# Patient Record
Sex: MALE | Race: BLACK OR AFRICAN AMERICAN | NOT HISPANIC OR LATINO | Employment: FULL TIME | URBAN - METROPOLITAN AREA
[De-identification: names, ages, dates, MRNs, and addresses within clinical notes are randomized per-mention and may not be internally consistent; named-entity substitution may affect disease eponyms.]

---

## 2022-09-14 ENCOUNTER — HOSPITAL ENCOUNTER (EMERGENCY)
Facility: OTHER | Age: 42
Discharge: HOME | End: 2022-09-14
Attending: EMERGENCY MEDICINE
Payer: MEDICAID

## 2022-09-14 VITALS
SYSTOLIC BLOOD PRESSURE: 159 MMHG | HEART RATE: 95 BPM | WEIGHT: 310 LBS | BODY MASS INDEX: 38.54 KG/M2 | DIASTOLIC BLOOD PRESSURE: 95 MMHG | TEMPERATURE: 98 F | RESPIRATION RATE: 25 BRPM | OXYGEN SATURATION: 97 % | HEIGHT: 75 IN

## 2022-09-14 DIAGNOSIS — R73.9 HIGH BLOOD SUGAR: ICD-10-CM

## 2022-09-14 DIAGNOSIS — R00.2 PALPITATIONS: Primary | ICD-10-CM

## 2022-09-14 DIAGNOSIS — G47.30 SLEEP APNEA, UNSPECIFIED TYPE: ICD-10-CM

## 2022-09-14 DIAGNOSIS — E87.6 HYPOKALEMIA: ICD-10-CM

## 2022-09-14 DIAGNOSIS — R00.2 PALPITATION: ICD-10-CM

## 2022-09-14 LAB
ANION GAP SERPL CALC-SCNC: 8 MMOL/L (ref 8–16)
BASOPHILS # BLD AUTO: 0.01 K/UL (ref 0–0.2)
BASOPHILS NFR BLD: 0.1 % (ref 0–1.9)
BNP SERPL-MCNC: <10 PG/ML (ref 0–99)
BUN SERPL-MCNC: 12 MG/DL (ref 6–20)
CALCIUM SERPL-MCNC: 8.7 MG/DL (ref 8.7–10.5)
CHLORIDE SERPL-SCNC: 103 MMOL/L (ref 95–110)
CO2 SERPL-SCNC: 27 MMOL/L (ref 23–29)
CREAT SERPL-MCNC: 1.3 MG/DL (ref 0.5–1.4)
DIFFERENTIAL METHOD: ABNORMAL
EOSINOPHIL # BLD AUTO: 0.1 K/UL (ref 0–0.5)
EOSINOPHIL NFR BLD: 1.2 % (ref 0–8)
ERYTHROCYTE [DISTWIDTH] IN BLOOD BY AUTOMATED COUNT: 14.1 % (ref 11.5–14.5)
EST. GFR  (NO RACE VARIABLE): >60 ML/MIN/1.73 M^2
GLUCOSE SERPL-MCNC: 246 MG/DL (ref 70–110)
HCT VFR BLD AUTO: 42.2 % (ref 40–54)
HGB BLD-MCNC: 13.3 G/DL (ref 14–18)
IMM GRANULOCYTES # BLD AUTO: 0.06 K/UL (ref 0–0.04)
IMM GRANULOCYTES NFR BLD AUTO: 0.6 % (ref 0–0.5)
LYMPHOCYTES # BLD AUTO: 1.3 K/UL (ref 1–4.8)
LYMPHOCYTES NFR BLD: 12.7 % (ref 18–48)
MCH RBC QN AUTO: 29.2 PG (ref 27–31)
MCHC RBC AUTO-ENTMCNC: 31.5 G/DL (ref 32–36)
MCV RBC AUTO: 93 FL (ref 82–98)
MONOCYTES # BLD AUTO: 0.4 K/UL (ref 0.3–1)
MONOCYTES NFR BLD: 3.6 % (ref 4–15)
NEUTROPHILS # BLD AUTO: 8.4 K/UL (ref 1.8–7.7)
NEUTROPHILS NFR BLD: 81.8 % (ref 38–73)
NRBC BLD-RTO: 0 /100 WBC
PLATELET # BLD AUTO: 282 K/UL (ref 150–450)
PMV BLD AUTO: 9.4 FL (ref 9.2–12.9)
POTASSIUM SERPL-SCNC: 3.3 MMOL/L (ref 3.5–5.1)
RBC # BLD AUTO: 4.55 M/UL (ref 4.6–6.2)
SODIUM SERPL-SCNC: 138 MMOL/L (ref 136–145)
T4 FREE SERPL-MCNC: 0.89 NG/DL (ref 0.71–1.51)
TROPONIN I SERPL DL<=0.01 NG/ML-MCNC: 0.01 NG/ML (ref 0–0.03)
TSH SERPL DL<=0.005 MIU/L-ACNC: 4.82 UIU/ML (ref 0.4–4)
WBC # BLD AUTO: 10.29 K/UL (ref 3.9–12.7)

## 2022-09-14 PROCEDURE — 36415 COLL VENOUS BLD VENIPUNCTURE: CPT | Performed by: EMERGENCY MEDICINE

## 2022-09-14 PROCEDURE — 84439 ASSAY OF FREE THYROXINE: CPT | Performed by: EMERGENCY MEDICINE

## 2022-09-14 PROCEDURE — 84443 ASSAY THYROID STIM HORMONE: CPT | Performed by: EMERGENCY MEDICINE

## 2022-09-14 PROCEDURE — 85025 COMPLETE CBC W/AUTO DIFF WBC: CPT | Performed by: EMERGENCY MEDICINE

## 2022-09-14 PROCEDURE — 93010 EKG 12-LEAD: ICD-10-PCS | Mod: ,,, | Performed by: INTERNAL MEDICINE

## 2022-09-14 PROCEDURE — 99285 EMERGENCY DEPT VISIT HI MDM: CPT | Mod: 25

## 2022-09-14 PROCEDURE — 80048 BASIC METABOLIC PNL TOTAL CA: CPT | Performed by: EMERGENCY MEDICINE

## 2022-09-14 PROCEDURE — 83880 ASSAY OF NATRIURETIC PEPTIDE: CPT | Performed by: EMERGENCY MEDICINE

## 2022-09-14 PROCEDURE — 93010 ELECTROCARDIOGRAM REPORT: CPT | Mod: ,,, | Performed by: INTERNAL MEDICINE

## 2022-09-14 PROCEDURE — 84484 ASSAY OF TROPONIN QUANT: CPT | Performed by: EMERGENCY MEDICINE

## 2022-09-14 PROCEDURE — 93005 ELECTROCARDIOGRAM TRACING: CPT

## 2022-09-14 RX ORDER — POTASSIUM CHLORIDE 750 MG/1
10 TABLET, EXTENDED RELEASE ORAL DAILY
Qty: 30 TABLET | Refills: 0 | Status: SHIPPED | OUTPATIENT
Start: 2022-09-14

## 2022-09-14 NOTE — ED NOTES
Pt arrives with reports of anxiety that began when he woke this am. Pt reports feeling 'like my heart was racing'. Denies history of anxiety. Pt appears NAD, calm on exam. VSS. Will continue to monitor.

## 2022-09-14 NOTE — DISCHARGE INSTRUCTIONS
Mr. Hunter,    Thank you for letting me care for you today! It was nice meeting you, and I hope you feel better soon.   If you would like access to your chart and what was done today please utilize the [facility].   Please come back to [facility] for all of your future medical needs.    Our goal in the emergency department is to always give you outstanding care and exceptional service. You may receive a survey by mail or e-mail in the next week regarding your experience in our ED. We would greatly appreciate you completing and returning the survey. Your feedback provides us with a way to recognize our staff who give very good care and it helps us learn how to improve when your experience was below our aspiration of excellence.     Sincerely,    Magdiel Parry MD  Board Certified Emergency Physician

## 2022-09-14 NOTE — ED NOTES
Provided pt specimen cup, educated on clean catch technique, verbalized understanding. Pt ambulatory to and from ED restroom with steady and even gait. Urine specimen sent to lab with HOLD sticker.

## 2022-09-14 NOTE — ED PROVIDER NOTES
Encounter Date: 9/14/2022    SCRIBE #1 NOTE: I, Gricel Max, am scribing for, and in the presence of,  Magdiel Parry MD.     History     Chief Complaint   Patient presents with    Anxiety     Pt woke up feeling anxious this morning      Time seen by provider: 6:30 AM    Dimitrios Hunter Jr. is a 42 y.o. male, with no PMHx, who presents to the ED after feeling anxious upon waking this morning. The patient reports he began experiencing shortness of breath and chest palpitations when he woke to use the restroom. He also notes tinging in his extremities. He states that he sat down and drank water without relief from his symptoms. He denies associated chest pain. The patient states he called EMS and began to feel improved during transport. He notes that he felt well last night and states that he ate chicken late at night and napped on his couch before going to bed. The patient reports that he believes he has sleep apnea, though he has never been diagnosed and did not follow up after previous episodes. He denies recent illness, but reports chronic congestion. He notes a PSHx of back and knee surgery but denies any heart issues. He denies any current medications or known allergies. He denies cigarette use but admits to mild EtOH and marijuana use. This is the extent of the patient's complaints today in the Emergency Department.     The history is provided by the patient.   Review of patient's allergies indicates:  No Known Allergies  No past medical history on file.  No past surgical history on file.  No family history on file.  Social History     Tobacco Use    Smoking status: Never   Substance Use Topics    Alcohol use: Yes     Comment: occasional    Drug use: Yes     Types: Marijuana     Review of Systems  Constitutional-no fever  HEENT-Notes congestion (chronic).  Eyes-no redness  Respiratory-Notes shortness of breath.  Cardio-no chest pain. Notes chest palpitations.  GI-no abdominal pain  Endocrine-no cold  intolerance  -no difficulty urinating  MSK-no myalgias  Skin-no rashes  Allergy-no environmental allergy  Neurologic-, no headache. Notes tingling in extremities.  Hematology-no swollen nodes  Behavioral-no confusion. Notes anxiety.    Physical Exam     Initial Vitals   BP Pulse Resp Temp SpO2   09/14/22 0624 09/14/22 0624 09/14/22 0645 09/14/22 0621 09/14/22 0624   (!) 165/88 93 20 98.2 °F (36.8 °C) (!) 93 %      MAP       --                Physical Exam  Constitutional: anxious appearing 41 yo man in moderate distress  Eyes: Conjunctivae normal.  ENT       Head: Normocephalic, atraumatic.       Nose: Normal external appearance        Mouth/Throat: no strigulous respirations   Hematological/Lymphatic/Immunilogical: no visible lymphadenopathy   Cardiovascular: Normal rate,   Respiratory: Normal respiratory effort.   Gastrointestinal: non distended   Musculoskeletal: Normal range of motion in all extremities. No obvious deformities or swelling.  Neurologic: Alert, oriented. Normal speech and language. No gross focal neurologic deficits are appreciated.  Skin: Skin is warm, dry. No rash noted.  Psychiatric: Mood and affect are normal.    ED Course   Procedures  Labs Reviewed   TSH - Abnormal; Notable for the following components:       Result Value    TSH 4.823 (*)     All other components within normal limits   CBC W/ AUTO DIFFERENTIAL - Abnormal; Notable for the following components:    RBC 4.55 (*)     Hemoglobin 13.3 (*)     MCHC 31.5 (*)     Immature Granulocytes 0.6 (*)     Gran # (ANC) 8.4 (*)     Immature Grans (Abs) 0.06 (*)     Gran % 81.8 (*)     Lymph % 12.7 (*)     Mono % 3.6 (*)     All other components within normal limits   BASIC METABOLIC PANEL - Abnormal; Notable for the following components:    Potassium 3.3 (*)     Glucose 246 (*)     All other components within normal limits   TROPONIN I   B-TYPE NATRIURETIC PEPTIDE   B-TYPE NATRIURETIC PEPTIDE   T4, FREE        ECG Results              EKG  12-lead (Final result)  Result time 09/14/22 10:19:27      Final result by Interface, Lab In [facility] (09/14/22 10:19:27)                   Narrative:    Test Reason : R00.2,    Vent. Rate : 093 BPM     Atrial Rate : 093 BPM     P-R Int : 182 ms          QRS Dur : 084 ms      QT Int : 382 ms       P-R-T Axes : 059 086 -02 degrees     QTc Int : 474 ms    Normal sinus rhythm  T wave abnormality, consider inferior ischemia  Prolonged QT  Abnormal ECG    Confirmed by Marti Ravi MD ([id number]) on 9/14/2022 10:19:18 AM    Referred By: [email or web address]   SELF           Confirmed By:Marti Ravi MD                      ED Interpretation by Magdiel Parry MD (09/14/22 06:49:10, [facility], [place] Emergency Dept, Emergency Medicine)    My EKG interpretation, sinus rhythm, 93 beats per minute, normal axis, T-wave inversion 3, AVF, V5 V6 no previous EKG for comparison                                  Imaging Results              X-Ray Chest AP Portable (Final result)  Result time 09/14/22 07:09:58      Final result by Curt Gary DO (09/14/22 07:09:58)                   Impression:      No acute abnormality.      Electronically signed by: Curt Gary  Date:    09/14/2022  Time:    07:09               Narrative:    EXAMINATION:  XR CHEST AP PORTABLE    CLINICAL HISTORY:  Palpitations    TECHNIQUE:  Single frontal view of the chest was performed.    COMPARISON:  None    FINDINGS:  The lungs are well expanded and clear. No focal opacities are seen. The pleural spaces are clear.    The cardiac silhouette is unremarkable.    The visualized osseous structures are unremarkable.                                       Medications - No data to display  Medical Decision Making:   History:   I obtained history from: EMS provider.  Old Medical Records: I decided to obtain old medical records.  Old Records Summarized: records from clinic visits.  Differential Diagnosis:   CHF, Arrythima, MI, pneumona, covid, anxiety, HTN urgency  Independently  Interpreted Test(s):   I have ordered and independently interpreted X-rays - see prior notes.  I have ordered and independently interpreted EKG Reading(s) - see prior notes  Clinical Tests:   Lab Tests: Ordered and Reviewed  Radiological Study: Ordered and Reviewed  Medical Tests: Ordered and Reviewed  ED Management:  41 yo with shortness of breath, known sleep apnea but difficulty following up.  Labs unrevealing outside of glucose mildly elevated.   After extensive discussion this patient thinks apnea and anxiety gave symptoms. Plans for close follow up and return in case of any worsening symptoms.         Scribe Attestation:   Scribe #1: I performed the above scribed service and the documentation accurately describes the services I performed. I attest to the accuracy of the note.            Physician Attestation for Scribe: I, magdiel parry, reviewed documentation as scribed in my presence, which is both accurate and complete.       Clinical Impression:   Final diagnoses:  [R00.2] Palpitation  [R00.2] Palpitations (Primary)  [E87.6] Hypokalemia  [G47.30] Sleep apnea, unspecified type  [R73.9] High blood sugar        ED Disposition Condition    Discharge Stable          ED Prescriptions       Medication Sig Dispense Start Date End Date Auth. Provider    potassium chloride (KLOR-CON) 10 MEQ TbSR Take 1 tablet (10 mEq total) by mouth once daily. 30 tablet 9/14/2022 -- Magdiel Parry MD          Follow-up Information       Follow up With Specialties Details Why Contact Info    [facility] Emergency Dept Emergency Medicine  If symptoms worsen, For a follow up visit about today [address] [facility] [id number] [phone]             Magdiel Parry MD  09/15/22 [id number]

## 2022-09-14 NOTE — Clinical Note
"Dimitrios Enamorado" Dale was seen and treated in our emergency department on 9/14/2022.  He may return to work on 09/16/2022.       If you have any questions or concerns, please don't hesitate to call.      Magdiel Parry MD"

## 2022-09-15 ENCOUNTER — TELEPHONE (OUTPATIENT)
Dept: ADMINISTRATIVE | Facility: OTHER | Age: 42
End: 2022-09-15
Payer: MEDICAID

## 2022-09-15 NOTE — TELEPHONE ENCOUNTER
Left voice message for patient to return call to schedule appointment from referral to Cardiology.  Consuelo ALMARAZ [phone]

## 2022-11-19 ENCOUNTER — HOSPITAL ENCOUNTER (EMERGENCY)
Facility: OTHER | Age: 42
Discharge: HOME | End: 2022-11-19
Attending: EMERGENCY MEDICINE
Payer: MEDICAID

## 2022-11-19 VITALS
TEMPERATURE: 98 F | SYSTOLIC BLOOD PRESSURE: 150 MMHG | RESPIRATION RATE: 20 BRPM | DIASTOLIC BLOOD PRESSURE: 89 MMHG | OXYGEN SATURATION: 95 % | HEART RATE: 85 BPM

## 2022-11-19 DIAGNOSIS — I95.9 HYPOTENSION, UNSPECIFIED HYPOTENSION TYPE: Primary | ICD-10-CM

## 2022-11-19 DIAGNOSIS — R41.82 ALTERED MENTAL STATUS: ICD-10-CM

## 2022-11-19 DIAGNOSIS — M79.672 LEFT FOOT PAIN: ICD-10-CM

## 2022-11-19 LAB
ALBUMIN SERPL BCP-MCNC: 4.2 G/DL (ref 3.5–5.2)
ALP SERPL-CCNC: 52 U/L (ref 55–135)
ALT SERPL W/O P-5'-P-CCNC: 16 U/L (ref 10–44)
AMPHET+METHAMPHET UR QL: NEGATIVE
ANION GAP SERPL CALC-SCNC: 13 MMOL/L (ref 8–16)
AST SERPL-CCNC: 24 U/L (ref 10–40)
BARBITURATES UR QL SCN>200 NG/ML: NEGATIVE
BASOPHILS # BLD AUTO: 0.03 K/UL (ref 0–0.2)
BASOPHILS NFR BLD: 0.3 % (ref 0–1.9)
BENZODIAZ UR QL SCN>200 NG/ML: NEGATIVE
BILIRUB SERPL-MCNC: 0.9 MG/DL (ref 0.1–1)
BILIRUB UR QL STRIP: NEGATIVE
BUN SERPL-MCNC: 12 MG/DL (ref 6–20)
BZE UR QL SCN: NEGATIVE
CALCIUM SERPL-MCNC: 9 MG/DL (ref 8.7–10.5)
CANNABINOIDS UR QL SCN: NEGATIVE
CHLORIDE SERPL-SCNC: 102 MMOL/L (ref 95–110)
CLARITY UR: CLEAR
CO2 SERPL-SCNC: 23 MMOL/L (ref 23–29)
COLOR UR: YELLOW
CREAT SERPL-MCNC: 0.9 MG/DL (ref 0.5–1.4)
CREAT SERPL-MCNC: 1.3 MG/DL (ref 0.5–1.4)
CREAT UR-MCNC: 44.3 MG/DL (ref 23–375)
CRP SERPL-MCNC: 4.2 MG/L (ref 0–8.2)
CTP QC/QA: YES
CTP QC/QA: YES
DIFFERENTIAL METHOD: ABNORMAL
EOSINOPHIL # BLD AUTO: 0.1 K/UL (ref 0–0.5)
EOSINOPHIL NFR BLD: 0.9 % (ref 0–8)
ERYTHROCYTE [DISTWIDTH] IN BLOOD BY AUTOMATED COUNT: 13.1 % (ref 11.5–14.5)
ERYTHROCYTE [SEDIMENTATION RATE] IN BLOOD: 6 MM/HR (ref 0–10)
EST. GFR  (NO RACE VARIABLE): >60 ML/MIN/1.73 M^2
GLUCOSE SERPL-MCNC: 195 MG/DL (ref 70–110)
GLUCOSE UR QL STRIP: NEGATIVE
HCO3 UR-SCNC: 28.9 MMOL/L (ref 24–28)
HCT VFR BLD AUTO: 45.1 % (ref 40–54)
HCT VFR BLD CALC: 45 %PCV (ref 36–54)
HGB BLD-MCNC: 14.4 G/DL (ref 14–18)
HGB BLD-MCNC: 15 G/DL
HGB UR QL STRIP: NEGATIVE
IMM GRANULOCYTES # BLD AUTO: 0.05 K/UL (ref 0–0.04)
IMM GRANULOCYTES NFR BLD AUTO: 0.5 % (ref 0–0.5)
KETONES UR QL STRIP: ABNORMAL
LDH SERPL L TO P-CCNC: 1.33 MMOL/L (ref 0.36–1.25)
LEUKOCYTE ESTERASE UR QL STRIP: NEGATIVE
LYMPHOCYTES # BLD AUTO: 3.9 K/UL (ref 1–4.8)
LYMPHOCYTES NFR BLD: 42.6 % (ref 18–48)
MCH RBC QN AUTO: 29.3 PG (ref 27–31)
MCHC RBC AUTO-ENTMCNC: 31.9 G/DL (ref 32–36)
MCV RBC AUTO: 92 FL (ref 82–98)
METHADONE UR QL SCN>300 NG/ML: NEGATIVE
MONOCYTES # BLD AUTO: 0.4 K/UL (ref 0.3–1)
MONOCYTES NFR BLD: 4.6 % (ref 4–15)
NEUTROPHILS # BLD AUTO: 4.7 K/UL (ref 1.8–7.7)
NEUTROPHILS NFR BLD: 51.1 % (ref 38–73)
NITRITE UR QL STRIP: NEGATIVE
NRBC BLD-RTO: 0 /100 WBC
OPIATES UR QL SCN: NEGATIVE
PCO2 BLDA: 48 MMHG (ref 35–45)
PCP UR QL SCN>25 NG/ML: NEGATIVE
PH SMN: 7.39 [PH] (ref 7.35–7.45)
PH UR STRIP: 7 [PH] (ref 5–8)
PLATELET # BLD AUTO: 330 K/UL (ref 150–450)
PMV BLD AUTO: 10.3 FL (ref 9.2–12.9)
PO2 BLDA: 72 MMHG (ref 80–100)
POC BE: 4 MMOL/L
POC IONIZED CALCIUM: 1.22 MMOL/L (ref 1.06–1.42)
POC MOLECULAR INFLUENZA A AGN: NEGATIVE
POC MOLECULAR INFLUENZA B AGN: NEGATIVE
POC PTINR: 1.5 (ref 0.9–1.2)
POC PTWBT: 17.1 SEC (ref 9.7–14.3)
POC SATURATED O2: 94 % (ref 95–100)
POC TCO2: 30 MMOL/L (ref 23–27)
POTASSIUM BLD-SCNC: 3.4 MMOL/L (ref 3.5–5.1)
POTASSIUM SERPL-SCNC: 4 MMOL/L (ref 3.5–5.1)
PROCALCITONIN SERPL IA-MCNC: 0.04 NG/ML
PROT SERPL-MCNC: 8.6 G/DL (ref 6–8.4)
PROT UR QL STRIP: ABNORMAL
RBC # BLD AUTO: 4.92 M/UL (ref 4.6–6.2)
SAMPLE: ABNORMAL
SAMPLE: NORMAL
SARS-COV-2 RDRP RESP QL NAA+PROBE: NEGATIVE
SODIUM BLD-SCNC: 139 MMOL/L (ref 136–145)
SODIUM SERPL-SCNC: 138 MMOL/L (ref 136–145)
SP GR UR STRIP: 1.01 (ref 1–1.03)
T4 FREE SERPL-MCNC: 0.95 NG/DL (ref 0.71–1.51)
TOXICOLOGY INFORMATION: NORMAL
TROPONIN I SERPL DL<=0.01 NG/ML-MCNC: 0.01 NG/ML (ref 0–0.03)
TSH SERPL DL<=0.005 MIU/L-ACNC: 7.32 UIU/ML (ref 0.4–4)
URN SPEC COLLECT METH UR: ABNORMAL
UROBILINOGEN UR STRIP-ACNC: NEGATIVE EU/DL
WBC # BLD AUTO: 9.25 K/UL (ref 3.9–12.7)

## 2022-11-19 PROCEDURE — 87040 BLOOD CULTURE FOR BACTERIA: CPT | Performed by: EMERGENCY MEDICINE

## 2022-11-19 PROCEDURE — 36415 COLL VENOUS BLD VENIPUNCTURE: CPT | Performed by: EMERGENCY MEDICINE

## 2022-11-19 PROCEDURE — 84439 ASSAY OF FREE THYROXINE: CPT | Performed by: EMERGENCY MEDICINE

## 2022-11-19 PROCEDURE — 84295 ASSAY OF SERUM SODIUM: CPT | Mod: 59

## 2022-11-19 PROCEDURE — 85025 COMPLETE CBC W/AUTO DIFF WBC: CPT | Performed by: EMERGENCY MEDICINE

## 2022-11-19 PROCEDURE — 85651 RBC SED RATE NONAUTOMATED: CPT | Performed by: EMERGENCY MEDICINE

## 2022-11-19 PROCEDURE — 84145 PROCALCITONIN (PCT): CPT | Performed by: EMERGENCY MEDICINE

## 2022-11-19 PROCEDURE — 85610 PROTHROMBIN TIME: CPT

## 2022-11-19 PROCEDURE — 93005 ELECTROCARDIOGRAM TRACING: CPT

## 2022-11-19 PROCEDURE — 80053 COMPREHEN METABOLIC PANEL: CPT | Performed by: EMERGENCY MEDICINE

## 2022-11-19 PROCEDURE — 96366 THER/PROPH/DIAG IV INF ADDON: CPT

## 2022-11-19 PROCEDURE — 93010 EKG 12-LEAD: ICD-10-PCS | Mod: ,,, | Performed by: INTERNAL MEDICINE

## 2022-11-19 PROCEDURE — 99900035 HC TECH TIME PER 15 MIN (STAT)

## 2022-11-19 PROCEDURE — 85014 HEMATOCRIT: CPT | Mod: 59

## 2022-11-19 PROCEDURE — 83605 ASSAY OF LACTIC ACID: CPT

## 2022-11-19 PROCEDURE — 86140 C-REACTIVE PROTEIN: CPT | Performed by: EMERGENCY MEDICINE

## 2022-11-19 PROCEDURE — 81003 URINALYSIS AUTO W/O SCOPE: CPT | Mod: 59 | Performed by: EMERGENCY MEDICINE

## 2022-11-19 PROCEDURE — 96365 THER/PROPH/DIAG IV INF INIT: CPT

## 2022-11-19 PROCEDURE — 82803 BLOOD GASES ANY COMBINATION: CPT

## 2022-11-19 PROCEDURE — 80307 DRUG TEST PRSMV CHEM ANLYZR: CPT | Performed by: EMERGENCY MEDICINE

## 2022-11-19 PROCEDURE — 82565 ASSAY OF CREATININE: CPT

## 2022-11-19 PROCEDURE — 87635 SARS-COV-2 COVID-19 AMP PRB: CPT | Performed by: EMERGENCY MEDICINE

## 2022-11-19 PROCEDURE — 63600175 PHARM REV CODE 636 W HCPCS: Performed by: EMERGENCY MEDICINE

## 2022-11-19 PROCEDURE — 93010 ELECTROCARDIOGRAM REPORT: CPT | Mod: ,,, | Performed by: INTERNAL MEDICINE

## 2022-11-19 PROCEDURE — 84443 ASSAY THYROID STIM HORMONE: CPT | Performed by: EMERGENCY MEDICINE

## 2022-11-19 PROCEDURE — 36600 WITHDRAWAL OF ARTERIAL BLOOD: CPT

## 2022-11-19 PROCEDURE — 82330 ASSAY OF CALCIUM: CPT

## 2022-11-19 PROCEDURE — 25000003 PHARM REV CODE 250: Performed by: EMERGENCY MEDICINE

## 2022-11-19 PROCEDURE — 84132 ASSAY OF SERUM POTASSIUM: CPT

## 2022-11-19 PROCEDURE — 99285 EMERGENCY DEPT VISIT HI MDM: CPT | Mod: 25

## 2022-11-19 PROCEDURE — 96361 HYDRATE IV INFUSION ADD-ON: CPT

## 2022-11-19 PROCEDURE — 84484 ASSAY OF TROPONIN QUANT: CPT | Performed by: EMERGENCY MEDICINE

## 2022-11-19 RX ORDER — MAGNESIUM SULFATE HEPTAHYDRATE 40 MG/ML
2 INJECTION, SOLUTION INTRAVENOUS
Status: COMPLETED | OUTPATIENT
Start: 2022-11-19 | End: 2022-11-19

## 2022-11-19 RX ADMIN — CARBAMIDE PEROXIDE 6.5% 5 DROP: 6.5 LIQUID AURICULAR (OTIC) at 04:11

## 2022-11-19 RX ADMIN — MAGNESIUM SULFATE HEPTAHYDRATE 2 G: 40 INJECTION, SOLUTION INTRAVENOUS at 01:11

## 2022-11-19 RX ADMIN — SODIUM CHLORIDE, SODIUM LACTATE, POTASSIUM CHLORIDE, AND CALCIUM CHLORIDE 1000 ML: .6; .31; .03; .02 INJECTION, SOLUTION INTRAVENOUS at 01:11

## 2022-11-19 NOTE — ED PROVIDER NOTES
"  Source of History:  Medical record, patient    Chief complaint:  Per triage note: "Fatigue (BIB friend c/o weakness and dizzy s/p taking gout medication, Indomethacin 1 hr PTA. Current BP 77/40. -CP -SOB denies symptoms in the past after same medication. Dr. Fish at bedside )  "    HPI:    Patient presents generalized weakness, presyncope.  Patient states that this morning, "I started feeling that gout pain,"at his right foot, so he took two pills of indomethacin prescribed to someone else about 1 hour prior to arrival.  Immediately prior to arrival he felt severely weak, so asked his friend to drive him straight to the emergency department.  He denies any alcohol or drug use.   History is limited by patient condition - he would initially fall asleep or trail off while answering questions. He was found to be hypotensive. Once given IV fluids, placed in head-down position in bed, with normalized /58, pt stated he felt much better, was much more lucid, but did not have much to add to HPI.   This is the extent of the patient's complaints at this time.     ROS:   As per HPI and below:   General: No fever.   HENT: No facial pain.   Eyes: No eye pain.   Cardiovascular: No chest pain.   Respiratory:  No dyspnea.   GI: No abdominal pain. No nausea. No vomiting.    Skin: No rashes.   Neuro:  No syncope.  No focal deficits.   Musculoskeletal: Notes extremity pain. Denies edema.   All other systems reviewed and are negative.      Review of patient's allergies indicates:  No Known Allergies    PMH:  As per HPI and below:  Past Medical History:   Diagnosis Date    History of gout        No past surgical history on file.    Social History     Tobacco Use    Smoking status: Never   Substance Use Topics    Alcohol use: Yes     Comment: occasional    Drug use: Yes     Types: Marijuana       Physical Exam:      Nursing note and vitals reviewed.  BP (!) 144/67   Pulse 88   Temp 97.8 °F (36.6 °C) (Oral)   Resp 20   SpO2 98% "     Constitutional: Morbidly obese. Acutely ill appearance. Initially somnulent, diaphoretic, slow to answer questions, would trail off and become unconscious while answering. Intermittent yawning.   Eyes: EOMI. Injected sclera. No discharge. Anicteric.  HENT:   Neck: Normal range of motion. Neck supple.  Cardiovascular: Normal rate. No murmur, no gallop and no friction rub heard.   Pulmonary/Chest: No respiratory distress. Effort normal. No wheezes, no rales, no rhonchi.   Abdominal: Bowel sounds normal. Soft. No distension and no mass. There is no tenderness. There is no rebound, no guarding, no tenderness at McBurney's point.  Musculoskeletal: Normal range of motion.   Neurological: GCS 15. Alert and oriented to person, place, and time. No gross cranial nerve, light touch or strength deficit. Coordination normal.   Skin: Skin is warm and dry.   EXT: 2+ radial pulses.  Left foot with no wounds, erythema, edema, tenderness, or warmth.   Psychiatric: Behavior is normal. Judgment normal.    MDM:    I decided to obtain the patient's medical records.  Pt is a 42 y.o. M with morbid obesity, ?ARA, gout who presents with altered mental status, hypotension.  He denied any other symptoms of acute infection.  He had no focal deficits, but was confused, somnolent upon presentation.  He was found to be hypotensive.  He was given IV fluids.  He had no apparent focus of infection, including at his left foot where he suspected he had beginnings of gout flare.  The initial differential was wide including sepsis, dehydration, gross metabolic abnormality, acute aortic syndrome, acute stroke including hemorrhagic stroke.     ED Course as of 11/19/22 1429   Sat Nov 19, 2022   1310 --  EKG Interpretation: I independently reviewed and interpreted EKG which shows normal sinus rhythm at 86 beats per minute, no STEMI, no significant acute ST/T abnormalities, QTc 478.  No acute change compared to prior tracing.  --   [RC]   1316 Pt  continues to have good mentation, normal blood pressure. He states he has minimal gout pain.  [RC]   5257 Pt was a difficult stick, causing delay in labs being drawn / sent.   I independently reviewed and interpreted labs which are notable for normal CRP, normal procalcitonin, unremarkable CBC, hyperglycemia with glucose 195, normal troponin.   I doubt cervical vessel injury, cerebral vessel stenosis requiring CTA head/neck.  I discussed the patient history, findings, plan with Dr. Canela, who assumes care.       =====================================  Critical Care:  35 minutes total critical care time was personally spent by me, exclusive of procedures and separately billable time.   Critical care was necessary to treat or prevent imminent or life-threatening deterioration of the following conditions: sepsis, hypotension.    =====================================     [RC]      ED Course User Index  [RC] Eligio Fish MD       Medications   magnesium sulfate 2g in water 50mL IVPB (premix) (2 g Intravenous New Bag 11/19/22 1332)   lactated ringers bolus 1,000 mL (1,000 mLs Intravenous New Bag 11/19/22 1311)   lactated ringers bolus 1,000 mL (1,000 mLs Intravenous New Bag 11/19/22 1310)              Diagnostic Impression:    1. Hypotension, unspecified hypotension type    2. Dyspnea    3. Altered mental status    4. Stroke    5. Chest pain                Eligio Fish MD  11/19/22 8808

## 2022-11-19 NOTE — DISCHARGE INSTRUCTIONS
Continue to hydrate  Can take Tylenol/ibuprofen as needed for pain.  Your workup today is reassuring  If you change your mind about staying in the hospital overnight for observation, return immediately.

## 2022-11-19 NOTE — ED NOTES
Pt ambulated in hallway with pulse oximetry monitoring. O2 sat=95% on RA at rest. O2 sat remained 94-95% while ambulating in hallway. Pt tolerated well without any complaints of cp, sob, lightheadedness, dizziness, weakness or any other discomfort at this time. AAOx4. Skin is warm and dry. Resp:20 even and unlabored. Denies pain. Dr. Canela reported with results. MD states ok to discharge pt to home.

## 2022-11-19 NOTE — PROVIDER PROGRESS NOTES - EMERGENCY DEPT.
Encounter Date: 11/19/2022    ED Physician Progress Notes        ED Physician Hand-off Note:    ED Course: I assumed care of patient from off-going ED physician team. Briefly, Patient is a 42-year-old male history of possible gout, questionable ARA presenting today for weakness, found to be hypotensive but responsive to fluids.  Initially CTA head neck ordered given his mental status change but he returned to baseline mental status wants his BP improved.    Per Dr. Fish,  he did not think vessel imaging was indicated at this time as he is returned to baseline mental status.  He was going to cancel the order for the CTA.     His labs were reviewed, no leukocytosis.  Hemoglobin stable.  CMP largely unremarkable.  Procalcitonin normal.      At the time of signout plan was pending Ct head, CXR and likely observation.    Medications given in the ED:    Medications   lactated ringers bolus 1,000 mL (1,000 mLs Intravenous New Bag 11/19/22 1311)   lactated ringers bolus 1,000 mL (1,000 mLs Intravenous New Bag 11/19/22 1310)   magnesium sulfate 2g in water 50mL IVPB (premix) (0 g Intravenous Stopped 11/19/22 1532)     4:00 PM  Chest x-ray reviewed which shows ill-defined opacity in the left lower lobe- patient denies cough, shortness of breath- or any other infectious symptoms.    CT head without acute process, there is complete opacification of the right mastoid air cells and middle ear.  Patient denies any ear pain, decreased hearing or tenderness over his mastoid.  Examined his ears, left ear is occluded with cerumen.  Right ear with mild fluid behind the TM with no bulging, erythema of the TM or retraction.    Procalis negative, low suspicion for bacterial source of infection at this time.    I discussed the plan for observation given his hypotension, patient declined states that he wants to go home.  He understands the risk of leaving, has capacity make his own decisions and has a family member at bedside.  Will  ambulate to ensure that his blood pressure is stable.  He also reports improvement in pain in the left lateral foot.      Disposition: discharge    Patient comfortable with plan. Patient counseled regarding exam, results, diagnosis, treatment, and plan.    Impression:   hypotension, resolved.   Left lateral foot pain  AMS, resolved  BRIAN Canela MD  Staff ED Physician  11/19/2022 4:15 PM

## 2022-11-24 LAB
BACTERIA BLD CULT: NORMAL
BACTERIA BLD CULT: NORMAL

## 2023-06-02 ENCOUNTER — HOSPITAL ENCOUNTER (EMERGENCY)
Facility: OTHER | Age: 43
Discharge: HOME | End: 2023-06-02
Attending: EMERGENCY MEDICINE
Payer: MEDICAID

## 2023-06-02 VITALS
OXYGEN SATURATION: 95 % | RESPIRATION RATE: 15 BRPM | HEART RATE: 85 BPM | TEMPERATURE: 98 F | SYSTOLIC BLOOD PRESSURE: 137 MMHG | DIASTOLIC BLOOD PRESSURE: 80 MMHG

## 2023-06-02 DIAGNOSIS — R55 NEAR SYNCOPE: ICD-10-CM

## 2023-06-02 DIAGNOSIS — G47.33 OSA (OBSTRUCTIVE SLEEP APNEA): Primary | ICD-10-CM

## 2023-06-02 LAB
ALBUMIN SERPL BCP-MCNC: 4 G/DL (ref 3.5–5.2)
ALP SERPL-CCNC: 49 U/L (ref 55–135)
ALT SERPL W/O P-5'-P-CCNC: 20 U/L (ref 10–44)
ANION GAP SERPL CALC-SCNC: 8 MMOL/L (ref 8–16)
AST SERPL-CCNC: 15 U/L (ref 10–40)
BASOPHILS # BLD AUTO: 0.02 K/UL (ref 0–0.2)
BASOPHILS NFR BLD: 0.1 % (ref 0–1.9)
BILIRUB SERPL-MCNC: 0.5 MG/DL (ref 0.1–1)
BUN SERPL-MCNC: 11 MG/DL (ref 6–20)
CALCIUM SERPL-MCNC: 8.9 MG/DL (ref 8.7–10.5)
CHLORIDE SERPL-SCNC: 101 MMOL/L (ref 95–110)
CK SERPL-CCNC: 189 U/L (ref 20–200)
CO2 SERPL-SCNC: 27 MMOL/L (ref 23–29)
CREAT SERPL-MCNC: 1 MG/DL (ref 0.5–1.4)
DIFFERENTIAL METHOD: ABNORMAL
EOSINOPHIL # BLD AUTO: 0.1 K/UL (ref 0–0.5)
EOSINOPHIL NFR BLD: 0.3 % (ref 0–8)
ERYTHROCYTE [DISTWIDTH] IN BLOOD BY AUTOMATED COUNT: 13.2 % (ref 11.5–14.5)
EST. GFR  (NO RACE VARIABLE): >60 ML/MIN/1.73 M^2
GLUCOSE SERPL-MCNC: 246 MG/DL (ref 70–110)
HCT VFR BLD AUTO: 42.9 % (ref 40–54)
HGB BLD-MCNC: 14 G/DL (ref 14–18)
IMM GRANULOCYTES # BLD AUTO: 0.1 K/UL (ref 0–0.04)
IMM GRANULOCYTES NFR BLD AUTO: 0.5 % (ref 0–0.5)
LYMPHOCYTES # BLD AUTO: 1.1 K/UL (ref 1–4.8)
LYMPHOCYTES NFR BLD: 6 % (ref 18–48)
MCH RBC QN AUTO: 30.5 PG (ref 27–31)
MCHC RBC AUTO-ENTMCNC: 32.6 G/DL (ref 32–36)
MCV RBC AUTO: 94 FL (ref 82–98)
MONOCYTES # BLD AUTO: 1.1 K/UL (ref 0.3–1)
MONOCYTES NFR BLD: 5.8 % (ref 4–15)
NEUTROPHILS # BLD AUTO: 16.1 K/UL (ref 1.8–7.7)
NEUTROPHILS NFR BLD: 87.3 % (ref 38–73)
NRBC BLD-RTO: 0 /100 WBC
PLATELET # BLD AUTO: 262 K/UL (ref 150–450)
PMV BLD AUTO: 9.3 FL (ref 9.2–12.9)
POTASSIUM SERPL-SCNC: 3.9 MMOL/L (ref 3.5–5.1)
PROT SERPL-MCNC: 7.7 G/DL (ref 6–8.4)
RBC # BLD AUTO: 4.59 M/UL (ref 4.6–6.2)
SODIUM SERPL-SCNC: 136 MMOL/L (ref 136–145)
WBC # BLD AUTO: 18.4 K/UL (ref 3.9–12.7)

## 2023-06-02 PROCEDURE — 63600175 PHARM REV CODE 636 W HCPCS: Performed by: EMERGENCY MEDICINE

## 2023-06-02 PROCEDURE — 85025 COMPLETE CBC W/AUTO DIFF WBC: CPT | Performed by: EMERGENCY MEDICINE

## 2023-06-02 PROCEDURE — 82550 ASSAY OF CK (CPK): CPT | Performed by: EMERGENCY MEDICINE

## 2023-06-02 PROCEDURE — 99285 EMERGENCY DEPT VISIT HI MDM: CPT | Mod: 25

## 2023-06-02 PROCEDURE — 93005 ELECTROCARDIOGRAM TRACING: CPT

## 2023-06-02 PROCEDURE — 80053 COMPREHEN METABOLIC PANEL: CPT | Performed by: EMERGENCY MEDICINE

## 2023-06-02 PROCEDURE — 96360 HYDRATION IV INFUSION INIT: CPT

## 2023-06-02 PROCEDURE — 93010 ELECTROCARDIOGRAM REPORT: CPT | Mod: ,,, | Performed by: INTERNAL MEDICINE

## 2023-06-02 PROCEDURE — 93010 EKG 12-LEAD: ICD-10-PCS | Mod: ,,, | Performed by: INTERNAL MEDICINE

## 2023-06-02 RX ADMIN — SODIUM CHLORIDE, POTASSIUM CHLORIDE, SODIUM LACTATE AND CALCIUM CHLORIDE 1000 ML: 600; 310; 30; 20 INJECTION, SOLUTION INTRAVENOUS at 08:06

## 2023-06-02 NOTE — ED TRIAGE NOTES
"Pt presents to ED c/o weakness/dizziness onset this am upon awakening. Pt states "it feels like my blood pressure is dropping and I can feel myself about to go out." Pt did not take BP at home PTA and pt states s/s have since resolved.  with EMS, SpO2 89-92 on RA per EMS but improved to >95% with 2L NC. Pt denies any hx of DM. Pt dozing off during assessment, states he has a sleep-study appt coming up to assess for ARA. Pt denies SOB, chest pain, fevers.  "

## 2023-06-02 NOTE — DISCHARGE INSTRUCTIONS
Your blood work did show a mild white count which could be an acute stress reaction.  Your x-ray did not show signs of pneumonia.  Your electrolytes were also within normal limits.  Please make sure that you get your sleep study, I think that your symptoms and your experience and are secondary to sleep apnea.  Return to emergency department for any other concerning symptom

## 2023-06-02 NOTE — ED PROVIDER NOTES
"Encounter Date: 6/2/2023    SCRIBE #1 NOTE: I, Bandar George, am scribing for, and in the presence of,  Dr. Canela. I have scribed the following portions of the note - Other sections scribed: HPI, ROS, Physical Exam.     History     Chief Complaint   Patient presents with    Dizziness     Dizziness/lightheadedness PTA, states s/s have resolved.  with EMS. Denies Hx of DM. EMS reporting RA SpO2 "between 89-92" on arrival to scene, improved to >95% with 2 LPM via NC.      Dimitrios Hunter Jr. is a 43 y.o. male who has a past medical history of sleep apnea who presents to the emergency department for evaluation of lightheadedness, onset this morning. The patient reports he took a shower, ate some boiled crabs, and went to bed at 0300 this morning. He reports waking up with palpitation as well as extremity weakness and cramping. He states he went to the restroom where he began to feel lightheaded, described as feeling like he was "fading." He denies any LOC. No SOB or chest pain. Pt called EMS but reports symptoms had resolved prior to their arrival to the scene.  Per EMS, patient noted to have hypoxia 88-90%, placed on nasal cannula transported to the emergency department.      The history is provided by the patient.   Review of patient's allergies indicates:  No Known Allergies  Past Medical History:   Diagnosis Date    History of gout     Morbid obesity      No past surgical history on file.  No family history on file.  Social History     Tobacco Use    Smoking status: Never   Substance Use Topics    Alcohol use: Yes     Comment: occasional    Drug use: Yes     Types: Marijuana     Review of Systems   Constitutional:  Negative for chills and fever.   HENT:  Negative for sore throat.    Eyes:  Negative for redness.   Respiratory:  Negative for shortness of breath.    Cardiovascular:  Negative for chest pain.   Gastrointestinal:  Negative for abdominal pain, diarrhea, nausea and vomiting.   Genitourinary:  " Negative for dysuria and hematuria.   Musculoskeletal:  Positive for myalgias. Negative for back pain.   Skin:  Negative for rash.   Neurological:  Positive for weakness and light-headedness. Negative for syncope and headaches.   All other systems reviewed and are negative.    Physical Exam     Initial Vitals   BP Pulse Resp Temp SpO2   06/02/23 0643 06/02/23 0643 06/02/23 0643 06/02/23 0823 06/02/23 0643   (!) 159/87 87 (!) 21 97.9 °F (36.6 °C) 98 %      MAP       --                Physical Exam    Constitutional: He appears well-developed and well-nourished. No distress.   Neck: Neck supple.   Cardiovascular:  Normal rate, regular rhythm and intact distal pulses.           Pulmonary/Chest: Breath sounds normal. No respiratory distress.   Abdominal: Abdomen is soft. There is no abdominal tenderness.   Musculoskeletal:         General: No edema.      Cervical back: Neck supple.     Neurological: He is alert and oriented to person, place, and time.   Skin: Skin is warm and dry.   Psychiatric: He has a normal mood and affect. Thought content normal.       ED Course   Procedures  Labs Reviewed   CBC W/ AUTO DIFFERENTIAL - Abnormal; Notable for the following components:       Result Value    WBC 18.40 (*)     RBC 4.59 (*)     Gran # (ANC) 16.1 (*)     Immature Grans (Abs) 0.10 (*)     Mono # 1.1 (*)     Gran % 87.3 (*)     Lymph % 6.0 (*)     All other components within normal limits   COMPREHENSIVE METABOLIC PANEL - Abnormal; Notable for the following components:    Glucose 246 (*)     Alkaline Phosphatase 49 (*)     All other components within normal limits   CK   CK     EKG Readings: (Independently Interpreted)   EKG:  Sinus rhythm at 90, normal intervals, normal axis, no ischemic changes     Imaging Results              X-Ray Chest PA And Lateral (Final result)  Result time 06/02/23 09:58:12      Final result by Cortez Dewitt III, MD (06/02/23 09:58:12)                   Impression:      No acute process  seen.      Electronically signed by: Cortez Dewitt MD  Date:    06/02/2023  Time:    09:58               Narrative:    EXAMINATION:  XR CHEST PA AND LATERAL    CLINICAL HISTORY:  chest pain;    FINDINGS:  Chest two views: Heart size is normal.  Lungs are clear.                                       Medications   lactated ringers bolus 1,000 mL (0 mLs Intravenous Stopped 6/2/23 0921)     Medical Decision Making:   History:   Old Medical Records: I decided to obtain old medical records.  Old Records Summarized: records from previous admission(s).       <> Summary of Records: Patient last seen 9/14/22 for similar complaint, workup that time was unremarkable.  Initial Assessment:   Emergent evaluation a 43-year-old male presenting today for palpitations, near-syncope after waking up suddenly this morning.    Vitals reviewed, mildly hypertensive and tachypneic, saturation 98% on room air.  Differential Diagnosis:   Arrhythmia, obstructive sleep apnea, aspiration pneumonia, anxiety  Independently Interpreted Test(s):   I have ordered and independently interpreted X-rays - see prior notes.  I have ordered and independently interpreted EKG Reading(s) - see prior notes  Clinical Tests:   Lab Tests: Ordered and Reviewed  Radiological Study: Ordered and Reviewed  Medical Tests: Ordered and Reviewed  ED Management:  - labs  - EKG  - cardiac monitoring  - IVF          Scribe Attestation:   Scribe #1: I performed the above scribed service and the documentation accurately describes the services I performed. I attest to the accuracy of the note.      ED Course as of 06/03/23 1049   Fri Jun 02, 2023   1013 X-ray reviewed and independently interpreted by me, no acute consolidation or effusion [GM]   1128 BUN: 11 [GM]   1128 Creatinine: 1.0 [GM]   1128 CPK: 189 [GM]   1128 Labs reviewed with a leukocytosis, patient denies fever or any infectious symptoms.  Unsure if this is reactive versus lab error given patient had multiple  hemolyzed specimens.  Chest x-ray did not show signs of pneumonia.  His CPK is normal, do not suspect rhabdo.  He does briefly desaturate when he is sleeping, I think his symptoms are likely from obstructive sleep apnea.  He states he is scheduled to have his sleep study soon.  Recommend continue workup for ARA, return to emergency department if symptoms get worse. [GM]      ED Course User Index  [GM] Azucena Canela MD          Physician Attestation for Scribe: IBRIAN MD, reviewed documentation as scribed in my presence, which is both accurate and complete.         Clinical Impression:   Final diagnoses:  [R55] Near syncope  [G47.33] ARA (obstructive sleep apnea) (Primary)        ED Disposition Condition    Discharge Stable          ED Prescriptions    None       Follow-up Information       Follow up With Specialties Details Why Contact Info    [facility] - Emergency Dept Emergency Medicine  [facility] symptoms worsen [address] [place] [facility] [address]  [phone]             Azucena Canela MD  06/03/23 5436

## 2023-06-02 NOTE — ED NOTES
Spoke to Airam in lab, who states CMP was hemolyzed, states potassium was re-run resulting at 3.8. New order placed for CMP so correct result can be posted. MD notified.

## 2023-06-16 ENCOUNTER — HOSPITAL ENCOUNTER (EMERGENCY)
Facility: OTHER | Age: 43
Discharge: HOME | End: 2023-06-16
Attending: EMERGENCY MEDICINE
Payer: MEDICAID

## 2023-06-16 VITALS
DIASTOLIC BLOOD PRESSURE: 80 MMHG | WEIGHT: 315 LBS | HEIGHT: 75 IN | SYSTOLIC BLOOD PRESSURE: 161 MMHG | BODY MASS INDEX: 39.17 KG/M2 | OXYGEN SATURATION: 94 % | HEART RATE: 89 BPM | TEMPERATURE: 98 F | RESPIRATION RATE: 18 BRPM

## 2023-06-16 DIAGNOSIS — F41.0 ANXIETY ATTACK: Primary | ICD-10-CM

## 2023-06-16 DIAGNOSIS — R73.9 HYPERGLYCEMIA: ICD-10-CM

## 2023-06-16 DIAGNOSIS — R00.2 PALPITATIONS: ICD-10-CM

## 2023-06-16 LAB
ALBUMIN SERPL BCP-MCNC: 4.1 G/DL (ref 3.5–5.2)
ALP SERPL-CCNC: 50 U/L (ref 55–135)
ALT SERPL W/O P-5'-P-CCNC: 20 U/L (ref 10–44)
ANION GAP SERPL CALC-SCNC: 10 MMOL/L (ref 8–16)
AST SERPL-CCNC: 16 U/L (ref 10–40)
BASOPHILS # BLD AUTO: 0.03 K/UL (ref 0–0.2)
BASOPHILS NFR BLD: 0.2 % (ref 0–1.9)
BILIRUB SERPL-MCNC: 0.7 MG/DL (ref 0.1–1)
BUN SERPL-MCNC: 11 MG/DL (ref 6–20)
CALCIUM SERPL-MCNC: 8.8 MG/DL (ref 8.7–10.5)
CHLORIDE SERPL-SCNC: 101 MMOL/L (ref 95–110)
CO2 SERPL-SCNC: 25 MMOL/L (ref 23–29)
CREAT SERPL-MCNC: 1.1 MG/DL (ref 0.5–1.4)
DIFFERENTIAL METHOD: ABNORMAL
EOSINOPHIL # BLD AUTO: 0.1 K/UL (ref 0–0.5)
EOSINOPHIL NFR BLD: 0.5 % (ref 0–8)
ERYTHROCYTE [DISTWIDTH] IN BLOOD BY AUTOMATED COUNT: 13.1 % (ref 11.5–14.5)
EST. GFR  (NO RACE VARIABLE): >60 ML/MIN/1.73 M^2
GLUCOSE SERPL-MCNC: 202 MG/DL (ref 70–110)
HCT VFR BLD AUTO: 43.9 % (ref 40–54)
HGB BLD-MCNC: 13.8 G/DL (ref 14–18)
IMM GRANULOCYTES # BLD AUTO: 0.08 K/UL (ref 0–0.04)
IMM GRANULOCYTES NFR BLD AUTO: 0.5 % (ref 0–0.5)
LYMPHOCYTES # BLD AUTO: 0.9 K/UL (ref 1–4.8)
LYMPHOCYTES NFR BLD: 4.9 % (ref 18–48)
MCH RBC QN AUTO: 29.2 PG (ref 27–31)
MCHC RBC AUTO-ENTMCNC: 31.4 G/DL (ref 32–36)
MCV RBC AUTO: 93 FL (ref 82–98)
MONOCYTES # BLD AUTO: 1 K/UL (ref 0.3–1)
MONOCYTES NFR BLD: 5.4 % (ref 4–15)
NEUTROPHILS # BLD AUTO: 15.6 K/UL (ref 1.8–7.7)
NEUTROPHILS NFR BLD: 88.5 % (ref 38–73)
NRBC BLD-RTO: 0 /100 WBC
PLATELET # BLD AUTO: 267 K/UL (ref 150–450)
PMV BLD AUTO: 9.2 FL (ref 9.2–12.9)
POTASSIUM SERPL-SCNC: 4.1 MMOL/L (ref 3.5–5.1)
PROT SERPL-MCNC: 8.1 G/DL (ref 6–8.4)
RBC # BLD AUTO: 4.72 M/UL (ref 4.6–6.2)
SODIUM SERPL-SCNC: 136 MMOL/L (ref 136–145)
TROPONIN I SERPL DL<=0.01 NG/ML-MCNC: 0.02 NG/ML (ref 0–0.03)
TSH SERPL DL<=0.005 MIU/L-ACNC: 2.18 UIU/ML (ref 0.4–4)
WBC # BLD AUTO: 17.61 K/UL (ref 3.9–12.7)

## 2023-06-16 PROCEDURE — 99284 EMERGENCY DEPT VISIT MOD MDM: CPT

## 2023-06-16 PROCEDURE — 84484 ASSAY OF TROPONIN QUANT: CPT | Performed by: EMERGENCY MEDICINE

## 2023-06-16 PROCEDURE — 84443 ASSAY THYROID STIM HORMONE: CPT | Performed by: EMERGENCY MEDICINE

## 2023-06-16 PROCEDURE — 80053 COMPREHEN METABOLIC PANEL: CPT | Performed by: EMERGENCY MEDICINE

## 2023-06-16 PROCEDURE — 85025 COMPLETE CBC W/AUTO DIFF WBC: CPT | Performed by: EMERGENCY MEDICINE

## 2023-06-16 RX ORDER — HYDROXYZINE PAMOATE 25 MG/1
25 CAPSULE ORAL EVERY 8 HOURS PRN
Qty: 20 CAPSULE | Refills: 0 | Status: SHIPPED | OUTPATIENT
Start: 2023-06-16

## 2023-06-16 NOTE — ED TRIAGE NOTES
Patient brought by EMS for c/o panic attack. Resolved on arrival to ED.     Two patient identifiers have been checked and are correct.      Appearance: Pt awake, alert & oriented to person, place & time. Pt in no acute distress at present time. Pt is clean and well groomed with clothes appropriately fastened.   Skin: Skin warm, dry & intact. Color consistent with ethnicity. Mucous membranes moist. No breakdown or brusing noted.   Musculoskeletal: Patient moving all extremities well, no obvious swelling or deformities noted.   Respiratory: Respirations spontaneous, even, and non-labored. Visible chest rise noted. Airway is open and patent. No accessory muscle use noted.   Neurologic: Sensation is intact. Speech is clear and appropriate. Eyes open spontaneously, behavior appropriate to situation, follows commands, facial expression symmetrical, bilateral hand grasp equal and even, purposeful motor response noted.  Cardiac: All peripheral pulses present. No Bilateral lower extremity edema. Cap refill is <3 seconds.  Abdomen: Abdomen soft, non-tender to palpation.   : Pt reports no dysuria or hematuria.

## 2023-06-17 NOTE — ED PROVIDER NOTES
Encounter Date: 6/16/2023    SCRIBE #1 NOTE: I, Nicole Miles, am scribing for, and in the presence of,  Geoff Hutchinson MD. I have scribed the following portions of the note - Other sections scribed: HPI, ROS, PE.     History     Chief Complaint   Patient presents with    Panic Attack     Pt brought in by NO EMS with c.o panic attacks.  EMS reports pt feels better at this time.  AAO x 3 nadn skin w.d  pt states he feels tired after having panic attack. Pt denies being on meds for anxiety.       Time seen by provider: 4:35 PM    This is a 43 y.o. male who presents with complaint of a panic attack. Patient states he was getting ready for the weekend when he suddenly felt his heart pounding followed by throat tightness then tingling and heaviness in the bilateral upper and lower extremities. No fever, chest pain, shortness of breath, or hyperventilation. This is the third similar event he has experienced, with the prior two occurring six months ago and several weeks ago respectively. He cannot recall any specific inciting triggers or increased stress surrounding either event. However, he does admit that he has had higher levels of stress due to taking care of his children and arguments with one of their mothers. PMHx of sleep apnea without formal diagnosis via sleep study. He denies any further medical history. This is the extent of the patient's complaints at this time.    The history is provided by the patient.   Review of patient's allergies indicates:  No Known Allergies  Past Medical History:   Diagnosis Date    History of gout     Morbid obesity      No past surgical history on file.  No family history on file.  Social History     Tobacco Use    Smoking status: Never   Substance Use Topics    Alcohol use: Yes     Comment: occasional    Drug use: Yes     Types: Marijuana     Review of Systems   Constitutional:  Negative for chills and fever.   HENT:  Negative for congestion.    Eyes:  Negative for  photophobia and redness.   Respiratory:  Negative for cough and shortness of breath.    Cardiovascular:  Positive for palpitations. Negative for chest pain.   Gastrointestinal:  Negative for abdominal pain, nausea and vomiting.   Genitourinary:  Negative for dysuria.   Musculoskeletal:  Negative for back pain.   Skin:  Negative for rash.   Neurological:  Positive for numbness. Negative for light-headedness and headaches.   Psychiatric/Behavioral:  Negative for confusion. The patient is nervous/anxious.      Physical Exam     Initial Vitals [06/16/23 1454]   BP Pulse Resp Temp SpO2   112/68 93 18 97.8 °F (36.6 °C) (!) 94 %      MAP       --         Physical Exam    Nursing note and vitals reviewed.  Constitutional: He appears well-developed and well-nourished. He is not diaphoretic. No distress.   HENT:   Head: Normocephalic and atraumatic.   Mouth/Throat: Oropharynx is clear and moist.   Eyes: Conjunctivae and EOM are normal.   Neck: Neck supple.   Cardiovascular:  Normal rate, regular rhythm and normal heart sounds.     Exam reveals no gallop and no friction rub.       No murmur heard.  Pulmonary/Chest: Breath sounds normal. No respiratory distress. He has no wheezes. He has no rhonchi. He has no rales.   Musculoskeletal:         General: No edema. Normal range of motion.      Cervical back: Neck supple.     Neurological: He is alert and oriented to person, place, and time. He has normal strength. No cranial nerve deficit.   Skin: Skin is warm and dry. No rash noted.   Psychiatric: He has a normal mood and affect. Thought content normal.       ED Course   Procedures  Labs Reviewed   CBC W/ AUTO DIFFERENTIAL - Abnormal; Notable for the following components:       Result Value    WBC 17.61 (*)     Hemoglobin 13.8 (*)     MCHC 31.4 (*)     Gran # (ANC) 15.6 (*)     Immature Grans (Abs) 0.08 (*)     Lymph # 0.9 (*)     Gran % 88.5 (*)     Lymph % 4.9 (*)     All other components within normal limits   COMPREHENSIVE  METABOLIC PANEL - Abnormal; Notable for the following components:    Glucose 202 (*)     Alkaline Phosphatase 50 (*)     All other components within normal limits   TROPONIN I   TSH          Imaging Results    None          Medications - No data to display  Medical Decision Making:   History:   Old Medical Records: I decided to obtain old medical records.  Initial Assessment:       43-year-old male with history of untreated suspected sleep apnea presents for evaluation after possible anxiety attack.  Patient was at normal baseline, about 45 minutes prior to arrival started having palpitations with associated throat tightness and extremity tingling and generalized weakness.  He called EMS and symptoms are improved currently, EMS gave no medications for it.  He denies any clear precipitant, not any stressful events today, has otherwise been at baseline with no recent illness.  No associated SOB or fevers, no leg swelling or chest pain/tightness.  He states he has had 2 previous episodes, was seen here for it 2 weeks ago, per chart review he was seen for lightheadedness and palpitations then with reassuring workup.  He does report increased stress over the past year related to family issues but no formal anxiety diagnosis, has not take any medications for it or seen a psychiatrist.  By description, these episodes could be related to anxiety reaction/panic attacks.  However given lack of clear precipitant or formal diagnosis will check basic labs to ensure no other cause such as electrolyte abnormality or less likely arrhythmia.  Patient has never had a formal sleep study to confirm ARA, but by symptoms this is very possible.  His initial O2 sat was 94% on room air that improved prior to my exam without supplemental O2, no abnormal lung sounds or difficulty breathing associated, no sign associated pulmonary hypertension and pulmonary edema.      Initial labs with elevated WBC similar to previous evaluation 2 weeks ago,  but no fever or infectious symptoms at this time to suggest pneumonia or UTI.  CMP with elevated glucose above 200, which is concerning for new onset diabetes, and was also elevated 2 weeks ago.  Patient does report some polyuria but no other significant diabetes symptoms.  He is in the process of finding a PCP, advised to keep diabetic diet until then when he can get H A1c for formal diagnosis.  EKG with no evidence for arrhythmia, and none seen while monitored on cardiac monitor.  Patient remained relatively asymptomatic while in ED, with no sign of cardiac etiology for her symptoms, normal troponin.  He will be prescribed Vistaril p.r.n. for further anxiety attacks, but also advised to establish care with psychiatrist for definitive management.  He is comfortable with discharge plan and understands return precautions for any worsening episodes or other concerns.      Clinical Tests:   Lab Tests: Ordered and Reviewed        Scribe Attestation:   Scribe #1: I performed the above scribed service and the documentation accurately describes the services I performed. I attest to the accuracy of the note.  I, Dr. Dg Perera, personally performed the services described in this documentation. All medical record entries made by the scribe were at my direction and in my presence.  I have reviewed the chart and agree that the record reflects my personal performance and is accurate and complete. Dg Perera MD.  10:48 PM 06/16/2023                     Clinical Impression:   Final diagnoses:  [R00.2] Palpitations  [F41.0] Anxiety attack (Primary)  [R73.9] Hyperglycemia        ED Disposition Condition    Discharge Stable          ED Prescriptions       Medication Sig Dispense Start Date End Date Auth. Provider    hydrOXYzine pamoate (VISTARIL) 25 MG Cap Take 1 capsule (25 mg total) by mouth every 8 (eight) hours as needed (Anxiety). 20 capsule 6/16/2023 -- Dg Perera MD          Follow-up Information        Follow up With Specialties Details Why Contact Info Additional Information    [facility] - Internal Medicine Internal Medicine Schedule an appointment as soon as possible for a visit in 1 week  [address] [facility] [id number] [phone] Internal Medicine - [facility], 8th Floor, [address] Please park in [address] and use [place] elevators             Dg Perera MD  06/16/23 1615

## 2024-12-27 ENCOUNTER — HOSPITAL ENCOUNTER (EMERGENCY)
Facility: OTHER | Age: 44
Discharge: HOME | End: 2024-12-28
Attending: EMERGENCY MEDICINE
Payer: MEDICAID

## 2024-12-27 DIAGNOSIS — E11.9 DIABETES MELLITUS, NEW ONSET: Primary | ICD-10-CM

## 2024-12-27 LAB — POCT GLUCOSE: >500 MG/DL (ref 70–110)

## 2024-12-27 PROCEDURE — 81000 URINALYSIS NONAUTO W/SCOPE: CPT | Performed by: EMERGENCY MEDICINE

## 2024-12-27 PROCEDURE — 96374 THER/PROPH/DIAG INJ IV PUSH: CPT

## 2024-12-27 PROCEDURE — 99291 CRITICAL CARE FIRST HOUR: CPT

## 2024-12-27 PROCEDURE — 96361 HYDRATE IV INFUSION ADD-ON: CPT

## 2024-12-27 PROCEDURE — 82962 GLUCOSE BLOOD TEST: CPT

## 2024-12-27 RX ORDER — SODIUM CHLORIDE 9 MG/ML
1000 INJECTION, SOLUTION INTRAVENOUS
Status: COMPLETED | OUTPATIENT
Start: 2024-12-28 | End: 2024-12-28

## 2024-12-28 VITALS
OXYGEN SATURATION: 96 % | WEIGHT: 315 LBS | BODY MASS INDEX: 39.17 KG/M2 | HEART RATE: 95 BPM | HEIGHT: 75 IN | RESPIRATION RATE: 17 BRPM | TEMPERATURE: 98 F | DIASTOLIC BLOOD PRESSURE: 65 MMHG | SYSTOLIC BLOOD PRESSURE: 116 MMHG

## 2024-12-28 LAB
ALBUMIN SERPL BCP-MCNC: 3.6 G/DL (ref 3.5–5.2)
ALP SERPL-CCNC: 51 U/L (ref 40–150)
ALT SERPL W/O P-5'-P-CCNC: 19 U/L (ref 10–44)
ANION GAP SERPL CALC-SCNC: 12 MMOL/L (ref 8–16)
AST SERPL-CCNC: 8 U/L (ref 10–40)
B-OH-BUTYR BLD STRIP-SCNC: 0.1 MMOL/L (ref 0–0.5)
BACTERIA #/AREA URNS HPF: NORMAL /HPF
BASOPHILS # BLD AUTO: 0.02 K/UL (ref 0–0.2)
BASOPHILS NFR BLD: 0.2 % (ref 0–1.9)
BILIRUB DIRECT SERPL-MCNC: 0.2 MG/DL (ref 0.1–0.3)
BILIRUB SERPL-MCNC: 0.5 MG/DL (ref 0.1–1)
BILIRUB UR QL STRIP: NEGATIVE
BUN SERPL-MCNC: 12 MG/DL (ref 6–20)
CALCIUM SERPL-MCNC: 9 MG/DL (ref 8.7–10.5)
CHLORIDE SERPL-SCNC: 98 MMOL/L (ref 95–110)
CLARITY UR: CLEAR
CO2 SERPL-SCNC: 20 MMOL/L (ref 23–29)
COLOR UR: COLORLESS
CREAT SERPL-MCNC: 1.2 MG/DL (ref 0.5–1.4)
DIFFERENTIAL METHOD BLD: ABNORMAL
EOSINOPHIL # BLD AUTO: 0.1 K/UL (ref 0–0.5)
EOSINOPHIL NFR BLD: 0.7 % (ref 0–8)
ERYTHROCYTE [DISTWIDTH] IN BLOOD BY AUTOMATED COUNT: 12.3 % (ref 11.5–14.5)
EST. GFR  (NO RACE VARIABLE): >60 ML/MIN/1.73 M^2
GLUCOSE SERPL-MCNC: 505 MG/DL (ref 70–110)
GLUCOSE UR QL STRIP: ABNORMAL
HCO3 UR-SCNC: 25.8 MMOL/L (ref 24–28)
HCT VFR BLD AUTO: 39.1 % (ref 40–54)
HCV AB SERPL QL IA: NEGATIVE
HGB BLD-MCNC: 13.1 G/DL (ref 14–18)
HGB UR QL STRIP: NEGATIVE
HIV 1+2 AB+HIV1 P24 AG SERPL QL IA: NEGATIVE
IMM GRANULOCYTES # BLD AUTO: 0.09 K/UL (ref 0–0.04)
IMM GRANULOCYTES NFR BLD AUTO: 0.8 % (ref 0–0.5)
KETONES UR QL STRIP: NEGATIVE
LEUKOCYTE ESTERASE UR QL STRIP: NEGATIVE
LYMPHOCYTES # BLD AUTO: 1.6 K/UL (ref 1–4.8)
LYMPHOCYTES NFR BLD: 14.7 % (ref 18–48)
MCH RBC QN AUTO: 29.9 PG (ref 27–31)
MCHC RBC AUTO-ENTMCNC: 33.5 G/DL (ref 32–36)
MCV RBC AUTO: 89 FL (ref 82–98)
MICROSCOPIC COMMENT: NORMAL
MONOCYTES # BLD AUTO: 0.8 K/UL (ref 0.3–1)
MONOCYTES NFR BLD: 7.6 % (ref 4–15)
NEUTROPHILS # BLD AUTO: 8.1 K/UL (ref 1.8–7.7)
NEUTROPHILS NFR BLD: 76 % (ref 38–73)
NITRITE UR QL STRIP: NEGATIVE
NRBC BLD-RTO: 0 /100 WBC
PCO2 BLDA: 46.5 MMHG (ref 35–45)
PH SMN: 7.35 [PH] (ref 7.35–7.45)
PH UR STRIP: 6 [PH] (ref 5–8)
PLATELET # BLD AUTO: 303 K/UL (ref 150–450)
PMV BLD AUTO: 9.9 FL (ref 9.2–12.9)
PO2 BLDA: 47 MMHG (ref 40–60)
POC BE: 0 MMOL/L
POC SATURATED O2: 80 % (ref 95–100)
POC TCO2: 27 MMOL/L (ref 24–29)
POCT GLUCOSE: 342 MG/DL (ref 70–110)
POCT GLUCOSE: 425 MG/DL (ref 70–110)
POTASSIUM SERPL-SCNC: 4 MMOL/L (ref 3.5–5.1)
PROT SERPL-MCNC: 7.3 G/DL (ref 6–8.4)
PROT UR QL STRIP: NEGATIVE
RBC # BLD AUTO: 4.38 M/UL (ref 4.6–6.2)
SAMPLE: ABNORMAL
SODIUM SERPL-SCNC: 130 MMOL/L (ref 136–145)
SP GR UR STRIP: 1.03 (ref 1–1.03)
URN SPEC COLLECT METH UR: ABNORMAL
UROBILINOGEN UR STRIP-ACNC: NEGATIVE EU/DL
WBC # BLD AUTO: 10.63 K/UL (ref 3.9–12.7)
YEAST URNS QL MICRO: NORMAL

## 2024-12-28 PROCEDURE — 87389 HIV-1 AG W/HIV-1&-2 AB AG IA: CPT | Performed by: EMERGENCY MEDICINE

## 2024-12-28 PROCEDURE — 63600175 PHARM REV CODE 636 W HCPCS: Performed by: EMERGENCY MEDICINE

## 2024-12-28 PROCEDURE — 82803 BLOOD GASES ANY COMBINATION: CPT

## 2024-12-28 PROCEDURE — 82010 KETONE BODYS QUAN: CPT | Performed by: EMERGENCY MEDICINE

## 2024-12-28 PROCEDURE — 80076 HEPATIC FUNCTION PANEL: CPT | Performed by: EMERGENCY MEDICINE

## 2024-12-28 PROCEDURE — 80048 BASIC METABOLIC PNL TOTAL CA: CPT | Performed by: EMERGENCY MEDICINE

## 2024-12-28 PROCEDURE — 85025 COMPLETE CBC W/AUTO DIFF WBC: CPT | Performed by: EMERGENCY MEDICINE

## 2024-12-28 PROCEDURE — 82800 BLOOD PH: CPT

## 2024-12-28 PROCEDURE — 99900035 HC TECH TIME PER 15 MIN (STAT)

## 2024-12-28 PROCEDURE — 25000003 PHARM REV CODE 250: Performed by: EMERGENCY MEDICINE

## 2024-12-28 PROCEDURE — 86803 HEPATITIS C AB TEST: CPT | Performed by: EMERGENCY MEDICINE

## 2024-12-28 RX ORDER — METFORMIN HYDROCHLORIDE 500 MG/1
500 TABLET ORAL 2 TIMES DAILY WITH MEALS
Qty: 60 TABLET | Refills: 0 | Status: SHIPPED | OUTPATIENT
Start: 2024-12-28 | End: 2025-01-27

## 2024-12-28 RX ORDER — METFORMIN HYDROCHLORIDE 500 MG/1
500 TABLET ORAL
Status: COMPLETED | OUTPATIENT
Start: 2024-12-28 | End: 2024-12-28

## 2024-12-28 RX ADMIN — INSULIN HUMAN 6 UNITS: 100 INJECTION, SOLUTION PARENTERAL at 01:12

## 2024-12-28 RX ADMIN — SODIUM CHLORIDE 1000 ML: 9 INJECTION, SOLUTION INTRAVENOUS at 12:12

## 2024-12-28 RX ADMIN — METFORMIN HYDROCHLORIDE 500 MG: 500 TABLET ORAL at 02:12

## 2024-12-28 NOTE — ED PROVIDER NOTES
"     Source of History:  The patient    Chief complaint:  Hyperglycemia (No hx of hyperglycemia , Pt also endorses excessive thirst and urination.)      HPI:  Dimitrios Hunter Jr. is a 44 y.o. male  with no past medical history presents with an episode of palpitations that occurred just prior to arrival.  His girlfriend who was with him stated his eyes looked red and they called the paramedics.  Currently he states he feels much better.  Paramedics checked blood sugar which was greater than 500.    Patient states over the last month he has had excessive thirst as well as urination.  Denies any history of diabetes and does not know if he has a significant family history of diabetes.    This is the extent to the patients complaints today here in the emergency department.    ROS:   See HPI.    Review of patient's allergies indicates:  No Known Allergies    PMH:  As per HPI and below:  Past Medical History:   Diagnosis Date    History of gout     Morbid obesity      History reviewed. No pertinent surgical history.    Social History     Tobacco Use    Smoking status: Never   Substance Use Topics    Alcohol use: Yes     Comment: occasional    Drug use: Yes     Types: Marijuana       Physical Exam:    /74   Pulse 98   Temp 98.3 °F (36.8 °C) (Oral)   Resp 20   Ht 6' 3" (1.905 m)   Wt (!) 145.2 kg (320 lb)   SpO2 95%   BMI 40.00 kg/m²   Nursing note and vital signs reviewed.  Constitutional: No acute distress.  Nontoxic  Eyes:  Conjunctiva normal.  Extraocular muscles are intact.  ENT:  Dry tacky mucous membranes  Cardiovascular:  Tachycardia.  Regular rhythm No murmurs. No gallops. No rubs  Respiratory: Clear to auscultation bilaterally.  Good air movement.  No wheezes.  No rhonchi. No rales. No accessory muscle use..  Abdomen: Soft.  Not distended.  Nontender.  No guarding.  No rebound. Non-peritoneal.  Neuro: alert. At baseline.  No focal neurological deficits.  MSK: No deformities.      Summary of Previous " Medical Records:        Differential Dx/ MDM/ Workup:  A 44-year-old male with what appears to be new onset diabetes possible DKA.  Will get labs, start IV fluids.  He is polydipsia as well as polyuria..  Vital signs otherwise are unremarkable.  Do not see any evidence of infectious etiology such as urinary tract infection, COVID, influenza or pneumonia.  No evidence of sepsis.      ED Course as of 12/28/24 0155   Sat Dec 28, 2024   0000 POCT Glucose(!!): >500 [SM]   0052 POC PH: 7.353 [SM]   0052 Beta-Hydroxybutyrate: 0.1 [SM]   0052 Leukocyte Esterase, UA: Negative [SM]   0052 NITRITE UA: Negative [SM]   0052 Glucose, UA(!): 4+ [SM]   0052 PH is 7.35 with a negative beta hydroxybutyrate making DKA unlikely.  Still with significant hyperglycemia that has new onset and pending chemistries to evaluate for metabolic derangement. [SM]   0056 Anion Gap: 12 [SM]   0056 Glucose(!!): 505 [SM]   0056 CO2(!): 20 [SM]   0056 Sodium(!): 130 [SM]   0056 Potassium: 4.0 [SM]   0115 POCT Glucose(!): 425  Pre insulin. [SM]   0129 Discussed results with the patient.  No evidence of DKA.  With his labs just being consistent with hyperglycemia I feel if we can get his sugars to a reasonable level with insulin we discharge with oral medication to follow up with primary care as an outpatient.  I discussed with the patient who agrees with the plan of care.  Answered all questions at this time. [SM]   0154 Repeat glucose is 342.  I feel comfortable discharge the patient home.  Will give oral metformin prior to discharge and then prescription for metformin to follow up with primary care.  Discussed at length with the patient about diet as well as exercise as well as reasons for return to the hospital including feeling faint, fatigued or excessive thirst or urination.      No further workup is indicated in the emergency department today.  I updated pt regarding results and I counseled pt regarding supportive care measures.  Diagnosis and  treatment plan explained to patient. I have answered all questions and the patient is satisfied with the plan of care. Patient discharged home in stable condition.  [SM]      ED Course User Index  [SM] Glen Zelaya DO                  Attending Attestation:         Attending Critical Care:   Critical Care Times:   Direct Patient Care (initial evaluation, reassessments, and time considering the case)................................................................35 minutes.   Ordering, Reviewing, and Interpreting Diagnostic Studies...............................................................................................................4 minutes.   Documentation..................................................................................................................................................................................5 minutes.   Consultation with other Physicians. .................................................................................................................................................6 minutes.   ==============================================================  Total Critical Care Time - exclusive of procedural time: 50 minutes.  ==============================================================        Diagnostic Impression:    1. Diabetes mellitus, new onset         ED Disposition Condition    Discharge Stable            ED Prescriptions       Medication Sig Dispense Start Date End Date Auth. Provider    metFORMIN (GLUCOPHAGE) 500 MG tablet Take 1 tablet (500 mg total) by mouth 2 (two) times daily with meals. 60 tablet 12/28/2024 1/27/2025 Glen Zelaya, DO          Follow-up Information       Follow up With Specialties Details Why Contact Info    [facility]  Go in 3 days to establish primary care and further management of your diabetes [address] [facility] [address]  [phone]               Glen Zelaya,  DO  12/28/24 0156

## 2025-07-13 ENCOUNTER — HOSPITAL ENCOUNTER (EMERGENCY)
Facility: OTHER | Age: 45
Discharge: HOME | End: 2025-07-13
Attending: EMERGENCY MEDICINE
Payer: COMMERCIAL

## 2025-07-13 VITALS
HEART RATE: 90 BPM | OXYGEN SATURATION: 98 % | DIASTOLIC BLOOD PRESSURE: 67 MMHG | BODY MASS INDEX: 40 KG/M2 | TEMPERATURE: 98 F | HEIGHT: 75 IN | SYSTOLIC BLOOD PRESSURE: 132 MMHG | RESPIRATION RATE: 17 BRPM

## 2025-07-13 DIAGNOSIS — R42 DIZZINESS: Primary | ICD-10-CM

## 2025-07-13 DIAGNOSIS — E11.65 TYPE 2 DIABETES MELLITUS WITH HYPERGLYCEMIA, WITHOUT LONG-TERM CURRENT USE OF INSULIN: ICD-10-CM

## 2025-07-13 LAB
ABSOLUTE EOSINOPHIL (OHS): 0.11 K/UL
ABSOLUTE MONOCYTE (OHS): 0.79 K/UL (ref 0.3–1)
ABSOLUTE NEUTROPHIL COUNT (OHS): 7.33 K/UL (ref 1.8–7.7)
ALBUMIN SERPL BCP-MCNC: 4.3 G/DL (ref 3.5–5.2)
ALP SERPL-CCNC: 52 UNIT/L (ref 40–150)
ALT SERPL W/O P-5'-P-CCNC: 17 UNIT/L (ref 10–44)
ANION GAP (OHS): 17 MMOL/L (ref 8–16)
AST SERPL-CCNC: 15 UNIT/L (ref 11–45)
BASOPHILS # BLD AUTO: 0.05 K/UL
BASOPHILS NFR BLD AUTO: 0.4 %
BILIRUB SERPL-MCNC: 0.6 MG/DL (ref 0.1–1)
BUN SERPL-MCNC: 15 MG/DL (ref 6–20)
CALCIUM SERPL-MCNC: 9.2 MG/DL (ref 8.7–10.5)
CHLORIDE SERPL-SCNC: 100 MMOL/L (ref 95–110)
CO2 SERPL-SCNC: 20 MMOL/L (ref 23–29)
CREAT SERPL-MCNC: 1.2 MG/DL (ref 0.5–1.4)
ERYTHROCYTE [DISTWIDTH] IN BLOOD BY AUTOMATED COUNT: 12.3 % (ref 11.5–14.5)
GFR SERPLBLD CREATININE-BSD FMLA CKD-EPI: >60 ML/MIN/1.73/M2
GLUCOSE SERPL-MCNC: 252 MG/DL (ref 70–110)
HCT VFR BLD AUTO: 43.3 % (ref 40–54)
HGB BLD-MCNC: 14.1 GM/DL (ref 14–18)
IMM GRANULOCYTES # BLD AUTO: 0.05 K/UL (ref 0–0.04)
IMM GRANULOCYTES NFR BLD AUTO: 0.4 % (ref 0–0.5)
LYMPHOCYTES # BLD AUTO: 3.7 K/UL (ref 1–4.8)
MCH RBC QN AUTO: 29.3 PG (ref 27–31)
MCHC RBC AUTO-ENTMCNC: 32.6 G/DL (ref 32–36)
MCV RBC AUTO: 90 FL (ref 82–98)
NUCLEATED RBC (/100WBC) (OHS): 0 /100 WBC
PLATELET # BLD AUTO: 302 K/UL (ref 150–450)
PMV BLD AUTO: 10 FL (ref 9.2–12.9)
POCT GLUCOSE: 229 MG/DL (ref 70–110)
POTASSIUM SERPL-SCNC: 3.3 MMOL/L (ref 3.5–5.1)
PROT SERPL-MCNC: 9.1 GM/DL (ref 6–8.4)
RBC # BLD AUTO: 4.82 M/UL (ref 4.6–6.2)
RELATIVE EOSINOPHIL (OHS): 0.9 %
RELATIVE LYMPHOCYTE (OHS): 30.8 % (ref 18–48)
RELATIVE MONOCYTE (OHS): 6.6 % (ref 4–15)
RELATIVE NEUTROPHIL (OHS): 60.9 % (ref 38–73)
SODIUM SERPL-SCNC: 137 MMOL/L (ref 136–145)
WBC # BLD AUTO: 12.03 K/UL (ref 3.9–12.7)

## 2025-07-13 PROCEDURE — 93010 ELECTROCARDIOGRAM REPORT: CPT | Mod: ,,, | Performed by: INTERNAL MEDICINE

## 2025-07-13 PROCEDURE — 82962 GLUCOSE BLOOD TEST: CPT

## 2025-07-13 PROCEDURE — 82040 ASSAY OF SERUM ALBUMIN: CPT | Performed by: EMERGENCY MEDICINE

## 2025-07-13 PROCEDURE — 93005 ELECTROCARDIOGRAM TRACING: CPT

## 2025-07-13 PROCEDURE — 85025 COMPLETE CBC W/AUTO DIFF WBC: CPT | Performed by: EMERGENCY MEDICINE

## 2025-07-13 PROCEDURE — 99284 EMERGENCY DEPT VISIT MOD MDM: CPT | Mod: 25
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
## 2025-07-14 LAB
OHS QRS DURATION: 82 MS
OHS QTC CALCULATION: 440 MS